# Patient Record
Sex: FEMALE | Race: WHITE | NOT HISPANIC OR LATINO | URBAN - METROPOLITAN AREA
[De-identification: names, ages, dates, MRNs, and addresses within clinical notes are randomized per-mention and may not be internally consistent; named-entity substitution may affect disease eponyms.]

---

## 2024-02-07 ENCOUNTER — OFFICE VISIT (OUTPATIENT)
Dept: OTOLARYNGOLOGY | Facility: CLINIC | Age: 89
End: 2024-02-07
Payer: COMMERCIAL

## 2024-02-07 VITALS — WEIGHT: 140 LBS | HEIGHT: 65 IN | BODY MASS INDEX: 23.32 KG/M2

## 2024-02-07 DIAGNOSIS — H61.23 BILATERAL IMPACTED CERUMEN: Primary | ICD-10-CM

## 2024-02-07 DIAGNOSIS — H90.3 SENSORINEURAL HEARING LOSS (SNHL) OF BOTH EARS: ICD-10-CM

## 2024-02-07 PROBLEM — M17.11 OSTEOARTHROSIS, LOCALIZED, PRIMARY, KNEE, RIGHT: Status: ACTIVE | Noted: 2024-02-07

## 2024-02-07 PROBLEM — H61.893 EAR CANAL DRYNESS, BILATERAL: Status: ACTIVE | Noted: 2024-02-07

## 2024-02-07 PROBLEM — K21.9 CHRONIC GERD: Status: ACTIVE | Noted: 2024-02-07

## 2024-02-07 PROBLEM — S32.030A COMPRESSION FRACTURE OF L3 VERTEBRA (MULTI): Status: ACTIVE | Noted: 2024-02-07

## 2024-02-07 PROBLEM — L28.0 NEURODERMATITIS: Status: ACTIVE | Noted: 2024-02-07

## 2024-02-07 PROBLEM — J30.9 ALLERGIC RHINITIS: Status: ACTIVE | Noted: 2024-02-07

## 2024-02-07 PROBLEM — F32.A MILD DEPRESSION: Status: ACTIVE | Noted: 2024-02-07

## 2024-02-07 PROBLEM — K22.89 ESOPHAGEAL PAIN: Status: ACTIVE | Noted: 2024-02-07

## 2024-02-07 PROBLEM — S46.911A STRAIN OF RIGHT UPPER ARM: Status: ACTIVE | Noted: 2024-02-07

## 2024-02-07 PROBLEM — M71.21 SYNOVIAL CYST OF RIGHT KNEE: Status: ACTIVE | Noted: 2024-02-07

## 2024-02-07 PROBLEM — M81.0 OSTEOPOROSIS: Status: ACTIVE | Noted: 2024-02-07

## 2024-02-07 PROBLEM — E78.5 HYPERLIPIDEMIA: Status: ACTIVE | Noted: 2024-02-07

## 2024-02-07 PROBLEM — M54.50 ACUTE MIDLINE LOW BACK PAIN WITHOUT SCIATICA: Status: ACTIVE | Noted: 2024-02-07

## 2024-02-07 PROBLEM — H61.22 IMPACTED CERUMEN OF LEFT EAR: Status: ACTIVE | Noted: 2024-02-07

## 2024-02-07 PROBLEM — R53.83 FATIGUE: Status: ACTIVE | Noted: 2024-02-07

## 2024-02-07 PROBLEM — H91.90 HEARING LOSS: Status: ACTIVE | Noted: 2024-02-07

## 2024-02-07 PROBLEM — F03.90 DEMENTIA (MULTI): Status: ACTIVE | Noted: 2024-02-07

## 2024-02-07 PROBLEM — R13.10 DYSPHAGIA: Status: ACTIVE | Noted: 2024-02-07

## 2024-02-07 PROBLEM — D64.9 ANEMIA: Status: ACTIVE | Noted: 2024-02-07

## 2024-02-07 PROCEDURE — 69210 REMOVE IMPACTED EAR WAX UNI: CPT | Performed by: NURSE PRACTITIONER

## 2024-02-07 PROCEDURE — 99212 OFFICE O/P EST SF 10 MIN: CPT | Performed by: NURSE PRACTITIONER

## 2024-02-07 PROCEDURE — 1159F MED LIST DOCD IN RCRD: CPT | Performed by: NURSE PRACTITIONER

## 2024-02-07 PROCEDURE — 1036F TOBACCO NON-USER: CPT | Performed by: NURSE PRACTITIONER

## 2024-02-07 PROCEDURE — 1160F RVW MEDS BY RX/DR IN RCRD: CPT | Performed by: NURSE PRACTITIONER

## 2024-02-07 RX ORDER — CALCIUM CARBONATE 300MG(750)
400 TABLET,CHEWABLE ORAL DAILY
COMMUNITY

## 2024-02-07 RX ORDER — LOPERAMIDE HCL 2 MG
2 TABLET ORAL 4 TIMES DAILY PRN
COMMUNITY

## 2024-02-07 RX ORDER — FLUTICASONE PROPIONATE 50 MCG
1 SPRAY, SUSPENSION (ML) NASAL DAILY
COMMUNITY
Start: 2023-09-30

## 2024-02-07 RX ORDER — OMEPRAZOLE 40 MG/1
40 CAPSULE, DELAYED RELEASE ORAL
COMMUNITY
Start: 2023-12-26

## 2024-02-07 RX ORDER — LANOLIN ALCOHOL/MO/W.PET/CERES
1000 CREAM (GRAM) TOPICAL DAILY
COMMUNITY

## 2024-02-07 RX ORDER — ATORVASTATIN CALCIUM 20 MG/1
20 TABLET, FILM COATED ORAL DAILY
COMMUNITY
Start: 2023-12-26

## 2024-02-07 RX ORDER — CALCIUM CARBONATE/VITAMIN D3 250-3.125
1 TABLET ORAL
COMMUNITY

## 2024-02-07 RX ORDER — DONEPEZIL HYDROCHLORIDE 10 MG/1
10 TABLET, FILM COATED ORAL NIGHTLY
COMMUNITY
Start: 2023-12-26

## 2024-02-07 RX ORDER — CITALOPRAM 20 MG/1
20 TABLET, FILM COATED ORAL DAILY
COMMUNITY
Start: 2023-12-26

## 2024-02-07 RX ORDER — ACETAMINOPHEN 500 MG
500 TABLET ORAL EVERY 6 HOURS PRN
COMMUNITY

## 2024-02-07 RX ORDER — HYDROXYZINE PAMOATE 25 MG/1
25 CAPSULE ORAL 3 TIMES DAILY PRN
COMMUNITY
Start: 2023-12-26

## 2024-02-07 RX ORDER — MIRTAZAPINE 15 MG/1
15 TABLET, FILM COATED ORAL NIGHTLY
COMMUNITY
Start: 2023-12-26

## 2024-02-07 NOTE — PROGRESS NOTES
Subjective   Patient ID: Katia Ramirez is a 89 y.o. female who presents for Follow-up.  HPI  Katia Ramirez is here today for ear cleaning. She is accompanied by her daughter Ayse Muñoz ( Masood Muñoz wife). Previously, I cleaned Ms. Ramirez's  ears and had recommended baby oil drops.  She has history of  ceruminosis and SNHL bilateral  ears.  Her ear cleaning is due today. No ear infection in the interim.  She is hearing better since last ear cleaning although she does not wear her hearing aids due to it keeps falling off from her ears.    Review of Systems    All other systems have been reviewed and are negative for complaints except for those mentioned in history of present illness, past medical history and problem list       Objective   Physical Exam  CONSTITUTIONAL: No acute distress, normal facial features; No fever; no chills  VOICE: No hoarseness or other audible abnormality  RESPIRATION: Breathing comfortably, no stridor; normal breathing effort  CV: No cyanosis visible on the face and neck area  EYES:Pupils equal and round ; no erythema; conjunctiva clear; sclera white  NEURO: Alert and oriented, able to raise eyebrows symmetrical bilateral, smile with no facial droop, able to swallow  HEAD AND FACE: Symmetric facial features, no masses or lesions    Right ear examination: External ear normal. EAC with impacted cerumen. TM not visualized.   Left ear examination: External ear normal. EAC with impacted cerumen. TM not visualized.     NOSE: External nose midline  ORAL CAVITY: No lesions of external lips  NECK/LYMPH: No obvious deformity or lesions; trachea is midline  PSYCH: Alert and oriented with appropriate mood and affect.    Patient ID: Katia Ramirez is a 89 y.o. female.    Procedures    Cerumen removal    Consent:  The planned procedure is discussed including possible risk, benefits and alternative treatments reviewed.  Verbal consent is obtained.    Indications:Obstructed cerumen is noted affecting  hearing and causing discomfort.    Procedure: The ears are examined microscopically.  Using speculum, alligator, #7, #5 suction the obstructive cerumen in both the ears were removed.    Findings: Cerumen and epithelial debris obstruction in both external auditory canals.  Inspection of tympanic membrane after cleaning showed intact with no effusion, retraction or perforation.    Post procedure: The patient tolerated the procedure well without complications       Assessment/Plan       1. Bilateral impacted cerumen        2. Sensorineural hearing loss (SNHL) of both ears          This patient presents for evaluation of cerumen impaction. The ear/s cleaned using microscope and instruments.  Patient tolerated the procedure well. Inspection of TM after cleaning showed intact and WNL.  Patient was provided instructions on ear care for cerumen in the discussion summary. Patient may follow up in 3 months for repeat cleaning or for all other ENT concerns.  All questions were answered to patient's satisfaction.            JACOBO Gurrola-ANNALISE 02/07/24 12:47 PM

## 2024-05-08 ENCOUNTER — OFFICE VISIT (OUTPATIENT)
Dept: OTOLARYNGOLOGY | Facility: CLINIC | Age: 89
End: 2024-05-08
Payer: COMMERCIAL

## 2024-05-08 DIAGNOSIS — H61.23 BILATERAL IMPACTED CERUMEN: Primary | ICD-10-CM

## 2024-05-08 DIAGNOSIS — H90.3 SENSORINEURAL HEARING LOSS (SNHL) OF BOTH EARS: ICD-10-CM

## 2024-05-08 PROCEDURE — 69210 REMOVE IMPACTED EAR WAX UNI: CPT | Performed by: NURSE PRACTITIONER

## 2024-05-08 PROCEDURE — 99212 OFFICE O/P EST SF 10 MIN: CPT | Performed by: NURSE PRACTITIONER

## 2024-05-08 PROCEDURE — 1160F RVW MEDS BY RX/DR IN RCRD: CPT | Performed by: NURSE PRACTITIONER

## 2024-05-08 PROCEDURE — 1036F TOBACCO NON-USER: CPT | Performed by: NURSE PRACTITIONER

## 2024-05-08 PROCEDURE — 1159F MED LIST DOCD IN RCRD: CPT | Performed by: NURSE PRACTITIONER

## 2024-05-08 ASSESSMENT — PATIENT HEALTH QUESTIONNAIRE - PHQ9
2. FEELING DOWN, DEPRESSED OR HOPELESS: NOT AT ALL
1. LITTLE INTEREST OR PLEASURE IN DOING THINGS: NOT AT ALL
SUM OF ALL RESPONSES TO PHQ9 QUESTIONS 1 AND 2: 0

## 2024-05-08 ASSESSMENT — ENCOUNTER SYMPTOMS
DEPRESSION: 0
OCCASIONAL FEELINGS OF UNSTEADINESS: 0
LOSS OF SENSATION IN FEET: 0

## 2024-05-08 ASSESSMENT — COLUMBIA-SUICIDE SEVERITY RATING SCALE - C-SSRS: 1. IN THE PAST MONTH, HAVE YOU WISHED YOU WERE DEAD OR WISHED YOU COULD GO TO SLEEP AND NOT WAKE UP?: NO

## 2024-05-09 NOTE — PROGRESS NOTES
Subjective   Patient ID: Katia Ramirez is a 89 y.o. female who presents for Follow-up and Cerumen Impaction.  HPI  Patient presents today for routine ear cleaning.  She is accompanied by her daughter Ayse Muñoz.  No significant changes since last visit.  No ear infection.  Patient has bilateral hearing aids but does not wear it consistently.  No reported falls.        Review of Systems      All other systems have been reviewed and are negative for complaints except for those mentioned in history of present illness, past medical history and problem list       Objective   Physical Exam    CONSTITUTIONAL: No acute distress, normal facial features; No fever; no chills  VOICE: No hoarseness or other audible abnormality  RESPIRATION: Breathing comfortably, no stridor; normal breathing effort  CV: No cyanosis visible on the face and neck area  EYES:Pupils equal and round ; no erythema; conjunctiva clear; sclera white  NEURO: Alert and oriented, able to raise eyebrows symmetrical bilateral, smile with no facial droop, able to swallow  HEAD AND FACE: Symmetric facial features, no masses or lesions    Right ear examination: External ear normal.  EAC with impacted cerumen.  TM not visualized.  Left ear examination: External ear normal.  EAC with impacted cerumen.  TM not visualized.    NOSE: External nose midline  ORAL CAVITY: No lesions of external lips  NECK/LYMPH: No obvious deformity or lesions; trachea is midline  PSYCH: Alert and oriented with appropriate mood and affect.    Patient ID: Katia Ramirez is a 89 y.o. female.    Procedures    Cerumen removal    Consent:  The planned procedure is discussed including possible risk, benefits and alternative treatments reviewed.  Verbal consent is obtained.    Indications:Obstructed cerumen is noted affecting hearing and causing discomfort.    Procedure: The ears are examined microscopically.  Using speculum, alligator, #7, #5 suction the obstructive cerumen in both the ears  were removed.    Findings: Cerumen and epithelial debris obstruction in both external auditory canals.  Inspection of tympanic membrane after cleaning showed intact with no effusion, retraction or perforation.    Post procedure: The patient tolerated the procedure well without complications       Assessment/Plan       Problem List Items Addressed This Visit       Hearing loss     Other Visit Diagnoses       Bilateral impacted cerumen    -  Primary         This patient presents for evaluation of cerumen impaction. The ear/s cleaned using microscope and instruments.  Patient tolerated the procedure well. Inspection of TM after cleaning showed intact and WNL.  Patient was provided instructions on ear care for cerumen in the discussion summary. Patient may follow up as needed for repeat cleaning or for all other ENT concerns.  All questions were answered to patient's satisfaction.            JACOBO Gurrola-CNP 05/09/24 7:06 AM

## 2024-11-13 ENCOUNTER — APPOINTMENT (OUTPATIENT)
Dept: OTOLARYNGOLOGY | Facility: CLINIC | Age: 89
End: 2024-11-13
Payer: COMMERCIAL

## 2024-12-11 ENCOUNTER — APPOINTMENT (OUTPATIENT)
Dept: OTOLARYNGOLOGY | Facility: CLINIC | Age: 89
End: 2024-12-11
Payer: COMMERCIAL

## 2024-12-11 DIAGNOSIS — H61.23 BILATERAL IMPACTED CERUMEN: Primary | ICD-10-CM

## 2024-12-11 PROCEDURE — 69210 REMOVE IMPACTED EAR WAX UNI: CPT | Performed by: NURSE PRACTITIONER

## 2024-12-11 PROCEDURE — 1159F MED LIST DOCD IN RCRD: CPT | Performed by: NURSE PRACTITIONER

## 2024-12-11 PROCEDURE — 1036F TOBACCO NON-USER: CPT | Performed by: NURSE PRACTITIONER

## 2024-12-11 PROCEDURE — 1160F RVW MEDS BY RX/DR IN RCRD: CPT | Performed by: NURSE PRACTITIONER

## 2024-12-11 NOTE — PROGRESS NOTES
Subjective   Patient ID: Katia Ramirez is a 89 y.o. female who presents for No chief complaint on file..  HPI  Patient presents today for routine ear cleaning.  She is accompanied by her daughter Ayse Muñoz.  No significant changes since last visit.  No ear infection.  Patient has bilateral hearing aids but does not wear it consistently.  No reported falls.        Review of Systems      All other systems have been reviewed and are negative for complaints except for those mentioned in history of present illness, past medical history and problem list       Objective   Physical Exam    CONSTITUTIONAL: No acute distress, normal facial features; No fever; no chills  VOICE: No hoarseness or other audible abnormality  RESPIRATION: Breathing comfortably, no stridor; normal breathing effort  CV: No cyanosis visible on the face and neck area  EYES:Pupils equal and round ; no erythema; conjunctiva clear; sclera white  NEURO: Alert and oriented, able to raise eyebrows symmetrical bilateral, smile with no facial droop, able to swallow  HEAD AND FACE: Symmetric facial features, no masses or lesions    Right ear examination: External ear normal.  EAC with impacted cerumen.  TM not visualized.  Left ear examination: External ear normal.  EAC with impacted cerumen.  TM not visualized.    NOSE: External nose midline  ORAL CAVITY: No lesions of external lips  NECK/LYMPH: No obvious deformity or lesions; trachea is midline  PSYCH: Alert and oriented with appropriate mood and affect.    Patient ID: Katia Ramirez is a 89 y.o. female.    Procedures    Cerumen removal    Consent:  The planned procedure is discussed including possible risk, benefits and alternative treatments reviewed.  Verbal consent is obtained.    Indications:Obstructed cerumen is noted affecting hearing and causing discomfort.    Procedure: The ears are examined microscopically.  Using speculum, alligator, #7, #5 suction the obstructive cerumen in both the ears were  removed.    Findings: Cerumen and epithelial debris obstruction in both external auditory canals.  Inspection of tympanic membrane after cleaning showed intact with no effusion, retraction or perforation.    Post procedure: The patient tolerated the procedure well without complications       Assessment/Plan     1. Bilateral impacted cerumen             This patient presents for evaluation of cerumen impaction. The ear/s cleaned using microscope and instruments.  Patient tolerated the procedure well. Inspection of TM after cleaning showed intact and WNL.  Patient was provided instructions on ear care for cerumen in the discussion summary. Patient may follow up as needed for repeat cleaning or for all other ENT concerns.  All questions were answered to patient's satisfaction.            JACOBO Gurrola-CNP 12/11/24 11:28 AM

## 2025-05-07 ENCOUNTER — APPOINTMENT (OUTPATIENT)
Dept: OTOLARYNGOLOGY | Facility: CLINIC | Age: OVER 89
End: 2025-05-07
Payer: COMMERCIAL